# Patient Record
Sex: FEMALE
[De-identification: names, ages, dates, MRNs, and addresses within clinical notes are randomized per-mention and may not be internally consistent; named-entity substitution may affect disease eponyms.]

---

## 2024-03-28 ENCOUNTER — APPOINTMENT (OUTPATIENT)
Age: 74
End: 2024-03-28
Payer: MEDICARE

## 2024-03-28 VITALS
WEIGHT: 140 LBS | DIASTOLIC BLOOD PRESSURE: 102 MMHG | HEART RATE: 59 BPM | BODY MASS INDEX: 25.76 KG/M2 | OXYGEN SATURATION: 98 % | HEIGHT: 62 IN | SYSTOLIC BLOOD PRESSURE: 156 MMHG

## 2024-03-28 DIAGNOSIS — M25.572 PAIN IN LEFT ANKLE AND JOINTS OF LEFT FOOT: ICD-10-CM

## 2024-03-28 DIAGNOSIS — S93.492A SPRAIN OF OTHER LIGAMENT OF LEFT ANKLE, INITIAL ENCOUNTER: ICD-10-CM

## 2024-03-28 PROBLEM — Z00.00 ENCOUNTER FOR PREVENTIVE HEALTH EXAMINATION: Status: ACTIVE | Noted: 2024-03-28

## 2024-03-28 PROCEDURE — 99203 OFFICE O/P NEW LOW 30 MIN: CPT

## 2024-03-28 PROCEDURE — 73610 X-RAY EXAM OF ANKLE: CPT | Mod: LT

## 2024-03-28 NOTE — DISCUSSION/SUMMARY
[de-identified] : COLBY ROSARIO is a 73 year old female presenting with acute mechanical fall resulting in ankle inversion and moderate grade 2 ankle sprain of the ATFL and CFL without fracture or dislocation.  X-rays negative for any significant fracture ankle mortise is intact.  Most of her pain is over the ATFL and CFL and likely is dealing with a moderate to significant ankle sprain.  Overall recommend the followin.  Lace up ankle brace prescribed and given in office.  Significant relief after donning the brace 2.  Ace wrap given to the patient to be worn when she is off her feet.  Instructed to wear the ankle brace at all times when she is on her feet for the next 2 to 4 weeks.  Encouraged only weightbearing as tolerated 3.  Patient will follow-up in 2 to 4 weeks if not making significant improvements we will get MRI for potential occult fracture or other soft tissue injury

## 2024-03-28 NOTE — PHYSICAL EXAM
[de-identified] : Foot/ankle (left)   Inspection  Skin: normal  Swelling: Moderate, diffusely over the lateral malleolus and dorsum of the foot over the ATFL and CFL Arch: normal  Tendon defect: none   Palpation  Tenderness: Moderate-severe Location: ATFL, CFL, posterior lateral malleolus  Ankle ROM  Dorsiflexion: normal  Plantarflexion: normal  Eversion: normal  Inversion: normal  Toe flexion: normal  Toe extension: normal  Foot Motor Strength  Ankle plantarflexion: 4/5  Dorsiflexion: 4/5  Inversion: 5/5  Eversion: 4/5   Sensory index  Normal Distal pulses Normal   Stress test  Negative: ankle lateral tilt, subtalar eversion   Anterior ankle drawer: 1+ laxity, positive subtalar inversion  Special Tests  Chowdhury test: normal  Kleigers test: normal  Tinnels: normal  [de-identified] : XR of left ankle Date: 3/28/2024   Views: 3 views Performed at [facility]: Yes Impression: No fracture no dislocation good alignment.  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  Ankle mortise intact.  These images were personally reviewed with original findings documented as above.

## 2024-03-28 NOTE — HISTORY OF PRESENT ILLNESS
[de-identified] : COLBY ROSARIO is a 73 year old female presenting with with her daughter status post mechanical fall 1 day ago on the marble floor.  Patient and her daughter states that she twisted her ankle and fell to the ground.  Since then she has had a moderate amount of swelling especially in the lateral aspect of the ankle and pain with ambulation.  She has been able to walk around in shoes but it is uncomfortable for her.  Patient and her daughter were here to see Dr. Cooper and given her acute injury to the ankle she was referred to see me for further evaluation.  Pain is mostly in the lateral aspect of the ankle has not had ankle injuries in the past.  Diffuse ankle swelling throughout the ankle is present.

## 2024-04-19 ENCOUNTER — APPOINTMENT (OUTPATIENT)
Age: 74
End: 2024-04-19
Payer: MEDICARE

## 2024-04-19 VITALS
HEIGHT: 62 IN | DIASTOLIC BLOOD PRESSURE: 91 MMHG | BODY MASS INDEX: 26.31 KG/M2 | SYSTOLIC BLOOD PRESSURE: 168 MMHG | HEART RATE: 75 BPM | OXYGEN SATURATION: 99 % | WEIGHT: 143 LBS

## 2024-04-19 DIAGNOSIS — S93.492D SPRAIN OF OTHER LIGAMENT OF LEFT ANKLE, SUBSEQUENT ENCOUNTER: ICD-10-CM

## 2024-04-19 PROCEDURE — 99213 OFFICE O/P EST LOW 20 MIN: CPT

## 2025-06-02 ENCOUNTER — APPOINTMENT (OUTPATIENT)
Dept: VASCULAR SURGERY | Facility: CLINIC | Age: 75
End: 2025-06-02

## 2025-06-02 VITALS — SYSTOLIC BLOOD PRESSURE: 160 MMHG | HEART RATE: 56 BPM | DIASTOLIC BLOOD PRESSURE: 82 MMHG

## 2025-06-02 VITALS
BODY MASS INDEX: 28.07 KG/M2 | SYSTOLIC BLOOD PRESSURE: 180 MMHG | TEMPERATURE: 98.3 F | HEART RATE: 60 BPM | HEIGHT: 60 IN | DIASTOLIC BLOOD PRESSURE: 85 MMHG | WEIGHT: 143 LBS

## 2025-06-02 DIAGNOSIS — Z78.9 OTHER SPECIFIED HEALTH STATUS: ICD-10-CM

## 2025-06-02 PROCEDURE — 99204 OFFICE O/P NEW MOD 45 MIN: CPT

## 2025-06-02 RX ORDER — OLMESARTAN MEDOXOMIL 40 MG/1
40 TABLET, FILM COATED ORAL
Refills: 0 | Status: ACTIVE | COMMUNITY

## 2025-06-02 RX ORDER — NEBIVOLOL 20 MG/1
TABLET ORAL
Refills: 0 | Status: ACTIVE | COMMUNITY